# Patient Record
Sex: MALE
[De-identification: names, ages, dates, MRNs, and addresses within clinical notes are randomized per-mention and may not be internally consistent; named-entity substitution may affect disease eponyms.]

---

## 2020-03-01 ENCOUNTER — HOSPITAL ENCOUNTER (EMERGENCY)
Dept: HOSPITAL 5 - ED | Age: 50
LOS: 1 days | Discharge: HOME | End: 2020-03-02
Payer: MEDICARE

## 2020-03-01 DIAGNOSIS — F43.20: ICD-10-CM

## 2020-03-01 DIAGNOSIS — G40.909: ICD-10-CM

## 2020-03-01 DIAGNOSIS — N30.00: Primary | ICD-10-CM

## 2020-03-01 DIAGNOSIS — Z79.899: ICD-10-CM

## 2020-03-01 DIAGNOSIS — F20.9: ICD-10-CM

## 2020-03-01 LAB
ALBUMIN SERPL-MCNC: 3.8 G/DL (ref 3.9–5)
ALT SERPL-CCNC: 22 UNITS/L (ref 7–56)
BACTERIA #/AREA URNS HPF: (no result) /HPF
BASOPHILS # (AUTO): 0.1 K/MM3 (ref 0–0.1)
BASOPHILS NFR BLD AUTO: 0.3 % (ref 0–1.8)
BENZODIAZEPINES SCREEN,URINE: (no result)
BILIRUB UR QL STRIP: (no result)
BLOOD UR QL VISUAL: (no result)
BUN SERPL-MCNC: 10 MG/DL (ref 9–20)
BUN/CREAT SERPL: 9 %
CALCIUM SERPL-MCNC: 9.3 MG/DL (ref 8.4–10.2)
EOSINOPHIL # BLD AUTO: 0.1 K/MM3 (ref 0–0.4)
EOSINOPHIL NFR BLD AUTO: 0.3 % (ref 0–4.3)
HCT VFR BLD CALC: 46.9 % (ref 35.5–45.6)
HEMOLYSIS INDEX: 15
HGB BLD-MCNC: 15.6 GM/DL (ref 11.8–15.2)
LYMPHOCYTES # BLD AUTO: 1.8 K/MM3 (ref 1.2–5.4)
LYMPHOCYTES NFR BLD AUTO: 9.4 % (ref 13.4–35)
MCHC RBC AUTO-ENTMCNC: 33 % (ref 32–34)
MCV RBC AUTO: 89 FL (ref 84–94)
METHADONE SCREEN,URINE: (no result)
MONOCYTES # (AUTO): 1.6 K/MM3 (ref 0–0.8)
MONOCYTES % (AUTO): 8.2 % (ref 0–7.3)
MUCOUS THREADS #/AREA URNS HPF: (no result) /HPF
OPIATE SCREEN,URINE: (no result)
PH UR STRIP: 5 [PH] (ref 5–7)
PLATELET # BLD: 498 K/MM3 (ref 140–440)
RBC # BLD AUTO: 5.25 M/MM3 (ref 3.65–5.03)
RBC #/AREA URNS HPF: 4 /HPF (ref 0–6)
UROBILINOGEN UR-MCNC: 4 MG/DL (ref ?–2)
WBC #/AREA URNS HPF: 61 /HPF (ref 0–6)

## 2020-03-01 PROCEDURE — 87086 URINE CULTURE/COLONY COUNT: CPT

## 2020-03-01 PROCEDURE — 85025 COMPLETE CBC W/AUTO DIFF WBC: CPT

## 2020-03-01 PROCEDURE — 80053 COMPREHEN METABOLIC PANEL: CPT

## 2020-03-01 PROCEDURE — 80307 DRUG TEST PRSMV CHEM ANLYZR: CPT

## 2020-03-01 PROCEDURE — 82550 ASSAY OF CK (CPK): CPT

## 2020-03-01 PROCEDURE — 99284 EMERGENCY DEPT VISIT MOD MDM: CPT

## 2020-03-01 PROCEDURE — 36415 COLL VENOUS BLD VENIPUNCTURE: CPT

## 2020-03-01 PROCEDURE — 81001 URINALYSIS AUTO W/SCOPE: CPT

## 2020-03-01 PROCEDURE — G0480 DRUG TEST DEF 1-7 CLASSES: HCPCS

## 2020-03-01 PROCEDURE — 87186 SC STD MICRODIL/AGAR DIL: CPT

## 2020-03-01 PROCEDURE — 83735 ASSAY OF MAGNESIUM: CPT

## 2020-03-01 PROCEDURE — 87076 CULTURE ANAEROBE IDENT EACH: CPT

## 2020-03-01 PROCEDURE — 80320 DRUG SCREEN QUANTALCOHOLS: CPT

## 2020-03-01 NOTE — EVENT NOTE
ED Screening Note


Date of service: 03/01/20


Time: 19:16


ED Screening Note: 


Pt having difficulty with ambulation intermittently x 3 weeks since since his 

mother passed


Hx of epilepsy and schizophrenia


denies back injuries or falls





This initial assessment/diagnostic orders/clinical plan/treatment(s) is/are 

subject to change based on patients health status, clinical progression and re-

assessment by fellow clinical providers in the ED. Further treatment and workup 

at subsequent clinical providers discretion. Patient/guardian urged not to elope

from the ED as their condition may be serious if not clinically assessed and 

managed. 





Initial orders include: 


labs


mental health eval

## 2020-03-02 VITALS — SYSTOLIC BLOOD PRESSURE: 103 MMHG | DIASTOLIC BLOOD PRESSURE: 68 MMHG

## 2020-03-02 NOTE — EMERGENCY DEPARTMENT REPORT
ED General Adult HPI





- General


Chief complaint: Medical Clearance


Stated complaint: MEDICAL EVALUATION


Time Seen by Provider: 03/01/20 19:15


Source: patient


Mode of arrival: Ambulatory


Limitations: No Limitations





- History of Present Illness


Initial comments: 





Patient is a 49-year-old  male with past medical history of 

schizophrenia and traumatic brain injury who is presenting for need for medical 

evaluation.  Patient sister is brought him in stating that since November 2019 

patient has been having bouts of days where he is will stay in bed all day.  His

activity level is decreased.  Patient has days where he refused to go to the 

bathroom and screams that he is scared to get out of the bed.  On those days he 

eats and drinks less.  Patient also has had days where he is back to having his 

normal daily activities.  Patient sister states that he is and what he is 

episodes where he is just seems to not be himself.  She wants to try to have him

evaluated.  Patient has made no threats of homicidality or suicidality.  Does 

not appear to be hallucinating at this time.  Sister denies any fevers chills 

nausea vomiting diarrhea cough cold or congestion.





- Related Data


                                  Previous Rx's











 Medication  Instructions  Recorded  Last Taken  Type


 


levoFLOXacin [Levaquin TAB] 500 mg PO QDAY #7 tablet 03/02/20 Unknown Rx











                                    Allergies











Allergy/AdvReac Type Severity Reaction Status Date / Time


 


No Known Allergies Allergy   Verified 03/01/20 18:05














ED Review of Systems


ROS: 


Stated complaint: MEDICAL EVALUATION


Other details as noted in HPI





Comment: All other systems reviewed and negative





ED Past Medical Hx





- Past Medical History


Previous Medical History?: Yes


Hx Seizures: Yes


Hx Psychiatric Treatment: Yes (schizophrenia)


Additional medical history: epilepsy





- Surgical History


Past Surgical History?: No





- Social History


Smoking Status: Never Smoker


Substance Use Type: None





- Medications


Home Medications: 


                                Home Medications











 Medication  Instructions  Recorded  Confirmed  Last Taken  Type


 


levoFLOXacin [Levaquin TAB] 500 mg PO QDAY #7 tablet 03/02/20  Unknown Rx














ED Physical Exam





- General


Limitations: No Limitations


General appearance: alert, in no apparent distress





- Head


Head exam: Present: atraumatic, normocephalic





- Eye


Eye exam: Present: normal appearance, PERRL, EOMI





- ENT


ENT exam: Present: mucous membranes moist





- Neck


Neck exam: Present: normal inspection





- Respiratory


Respiratory exam: Present: normal lung sounds bilaterally.  Absent: respiratory 

distress, wheezes, rales, rhonchi





- Cardiovascular


Cardiovascular Exam: Present: regular rate, normal rhythm, normal heart sounds. 

Absent: systolic murmur, diastolic murmur, rubs, gallop





- GI/Abdominal


GI/Abdominal exam: Present: soft, normal bowel sounds.  Absent: distended, 

tenderness, guarding, rebound, rigid





- Rectal


Rectal exam: Present: deferred





- Extremities Exam


Extremities exam: Present: normal inspection





- Back Exam


Back exam: Present: normal inspection





- Neurological Exam


Neurological exam: Present: alert, oriented X3





- Psychiatric


Psychiatric exam: Present: normal affect, normal mood





- Skin


Skin exam: Present: warm, dry, intact, normal color.  Absent: rash





ED Course


                                   Vital Signs











  03/01/20 03/01/20 03/01/20





  19:16 20:56 23:10


 


Temperature 97.4 F L 98.5 F 98.5 F


 


Pulse Rate 116 H 107 H 98 H


 


Respiratory 18 18 16





Rate   


 


Blood Pressure 101/65  


 


Blood Pressure  112/70 98/55





[Left]   


 


O2 Sat by Pulse 99 98 97





Oximetry   














  03/02/20





  00:53


 


Temperature 98.4 F


 


Pulse Rate 99 H


 


Respiratory 14





Rate 


 


Blood Pressure 


 


Blood Pressure 100/56





[Left] 


 


O2 Sat by Pulse 100





Oximetry 














ED Medical Decision Making





- Lab Data


Result diagrams: 


                                 03/01/20 19:24





                                 03/01/20 19:24








                                   Lab Results











  03/01/20 03/01/20 03/01/20 Range/Units





  19:24 19:24 19:24 


 


WBC  19.4 H    (4.5-11.0)  K/mm3


 


RBC  5.25 H    (3.65-5.03)  M/mm3


 


Hgb  15.6 H    (11.8-15.2)  gm/dl


 


Hct  46.9 H    (35.5-45.6)  %


 


MCV  89    (84-94)  fl


 


MCH  30    (28-32)  pg


 


MCHC  33    (32-34)  %


 


RDW  13.1 L    (13.2-15.2)  %


 


Plt Count  498 H    (140-440)  K/mm3


 


Lymph % (Auto)  9.4 L    (13.4-35.0)  %


 


Mono % (Auto)  8.2 H    (0.0-7.3)  %


 


Eos % (Auto)  0.3    (0.0-4.3)  %


 


Baso % (Auto)  0.3    (0.0-1.8)  %


 


Lymph #  1.8    (1.2-5.4)  K/mm3


 


Mono #  1.6 H    (0.0-0.8)  K/mm3


 


Eos #  0.1    (0.0-0.4)  K/mm3


 


Baso #  0.1    (0.0-0.1)  K/mm3


 


Seg Neutrophils %  81.8 H    (40.0-70.0)  %


 


Seg Neutrophils #  15.9 H    (1.8-7.7)  K/mm3


 


Sodium   136 L   (137-145)  mmol/L


 


Potassium   3.4 L   (3.6-5.0)  mmol/L


 


Chloride   95.2 L   ()  mmol/L


 


Carbon Dioxide   22   (22-30)  mmol/L


 


Anion Gap   22   mmol/L


 


BUN   10   (9-20)  mg/dL


 


Creatinine   1.1   (0.8-1.5)  mg/dL


 


Estimated GFR   > 60   ml/min


 


BUN/Creatinine Ratio   9   %


 


Glucose   162 H   ()  mg/dL


 


Calcium   9.3   (8.4-10.2)  mg/dL


 


Magnesium    2.00  (1.7-2.3)  mg/dL


 


Total Bilirubin   0.60   (0.1-1.2)  mg/dL


 


AST   27   (5-40)  units/L


 


ALT   22   (7-56)  units/L


 


Alkaline Phosphatase   77   ()  units/L


 


Total Creatine Kinase   101   ()  units/L


 


Total Protein   8.1   (6.3-8.2)  g/dL


 


Albumin   3.8 L   (3.9-5)  g/dL


 


Albumin/Globulin Ratio   0.9   %


 


Urine Color     (Yellow)  


 


Urine Turbidity     (Clear)  


 


Urine pH     (5.0-7.0)  


 


Ur Specific Gravity     (1.003-1.030)  


 


Urine Protein     (Negative)  mg/dL


 


Urine Glucose (UA)     (Negative)  mg/dL


 


Urine Ketones     (Negative)  mg/dL


 


Urine Blood     (Negative)  


 


Urine Nitrite     (Negative)  


 


Urine Bilirubin     (Negative)  


 


Urine Urobilinogen     (<2.0)  mg/dL


 


Ur Leukocyte Esterase     (Negative)  


 


Urine WBC (Auto)     (0.0-6.0)  /HPF


 


Urine RBC (Auto)     (0.0-6.0)  /HPF


 


Urine Bacteria (Auto)     (Negative)  /HPF


 


Urine WBC Clumps     /HPF


 


Urine Mucus     /HPF


 


Salicylates     (2.8-20.0)  mg/dL


 


Urine Opiates Screen     


 


Urine Methadone Screen     


 


Acetaminophen     (10.0-30.0)  ug/mL


 


Ur Barbiturates Screen     


 


Ur Phencyclidine Scrn     


 


Ur Amphetamines Screen     


 


U Benzodiazepines Scrn     


 


Urine Cocaine Screen     


 


U Marijuana (THC) Screen     


 


Drugs of Abuse Note     


 


Plasma/Serum Alcohol     (0-0.07)  %














  03/01/20 03/01/20 03/01/20 Range/Units





  19:24 19:24 19:24 


 


WBC     (4.5-11.0)  K/mm3


 


RBC     (3.65-5.03)  M/mm3


 


Hgb     (11.8-15.2)  gm/dl


 


Hct     (35.5-45.6)  %


 


MCV     (84-94)  fl


 


MCH     (28-32)  pg


 


MCHC     (32-34)  %


 


RDW     (13.2-15.2)  %


 


Plt Count     (140-440)  K/mm3


 


Lymph % (Auto)     (13.4-35.0)  %


 


Mono % (Auto)     (0.0-7.3)  %


 


Eos % (Auto)     (0.0-4.3)  %


 


Baso % (Auto)     (0.0-1.8)  %


 


Lymph #     (1.2-5.4)  K/mm3


 


Mono #     (0.0-0.8)  K/mm3


 


Eos #     (0.0-0.4)  K/mm3


 


Baso #     (0.0-0.1)  K/mm3


 


Seg Neutrophils %     (40.0-70.0)  %


 


Seg Neutrophils #     (1.8-7.7)  K/mm3


 


Sodium     (137-145)  mmol/L


 


Potassium     (3.6-5.0)  mmol/L


 


Chloride     ()  mmol/L


 


Carbon Dioxide     (22-30)  mmol/L


 


Anion Gap     mmol/L


 


BUN     (9-20)  mg/dL


 


Creatinine     (0.8-1.5)  mg/dL


 


Estimated GFR     ml/min


 


BUN/Creatinine Ratio     %


 


Glucose     ()  mg/dL


 


Calcium     (8.4-10.2)  mg/dL


 


Magnesium     (1.7-2.3)  mg/dL


 


Total Bilirubin     (0.1-1.2)  mg/dL


 


AST     (5-40)  units/L


 


ALT     (7-56)  units/L


 


Alkaline Phosphatase     ()  units/L


 


Total Creatine Kinase     ()  units/L


 


Total Protein     (6.3-8.2)  g/dL


 


Albumin     (3.9-5)  g/dL


 


Albumin/Globulin Ratio     %


 


Urine Color     (Yellow)  


 


Urine Turbidity     (Clear)  


 


Urine pH     (5.0-7.0)  


 


Ur Specific Gravity     (1.003-1.030)  


 


Urine Protein     (Negative)  mg/dL


 


Urine Glucose (UA)     (Negative)  mg/dL


 


Urine Ketones     (Negative)  mg/dL


 


Urine Blood     (Negative)  


 


Urine Nitrite     (Negative)  


 


Urine Bilirubin     (Negative)  


 


Urine Urobilinogen     (<2.0)  mg/dL


 


Ur Leukocyte Esterase     (Negative)  


 


Urine WBC (Auto)     (0.0-6.0)  /HPF


 


Urine RBC (Auto)     (0.0-6.0)  /HPF


 


Urine Bacteria (Auto)     (Negative)  /HPF


 


Urine WBC Clumps     /HPF


 


Urine Mucus     /HPF


 


Salicylates  < 0.3 L    (2.8-20.0)  mg/dL


 


Urine Opiates Screen     


 


Urine Methadone Screen     


 


Acetaminophen   < 5.0 L   (10.0-30.0)  ug/mL


 


Ur Barbiturates Screen     


 


Ur Phencyclidine Scrn     


 


Ur Amphetamines Screen     


 


U Benzodiazepines Scrn     


 


Urine Cocaine Screen     


 


U Marijuana (THC) Screen     


 


Drugs of Abuse Note     


 


Plasma/Serum Alcohol    < 0.01  (0-0.07)  %














  03/01/20 03/01/20 Range/Units





  21:57 21:57 


 


WBC    (4.5-11.0)  K/mm3


 


RBC    (3.65-5.03)  M/mm3


 


Hgb    (11.8-15.2)  gm/dl


 


Hct    (35.5-45.6)  %


 


MCV    (84-94)  fl


 


MCH    (28-32)  pg


 


MCHC    (32-34)  %


 


RDW    (13.2-15.2)  %


 


Plt Count    (140-440)  K/mm3


 


Lymph % (Auto)    (13.4-35.0)  %


 


Mono % (Auto)    (0.0-7.3)  %


 


Eos % (Auto)    (0.0-4.3)  %


 


Baso % (Auto)    (0.0-1.8)  %


 


Lymph #    (1.2-5.4)  K/mm3


 


Mono #    (0.0-0.8)  K/mm3


 


Eos #    (0.0-0.4)  K/mm3


 


Baso #    (0.0-0.1)  K/mm3


 


Seg Neutrophils %    (40.0-70.0)  %


 


Seg Neutrophils #    (1.8-7.7)  K/mm3


 


Sodium    (137-145)  mmol/L


 


Potassium    (3.6-5.0)  mmol/L


 


Chloride    ()  mmol/L


 


Carbon Dioxide    (22-30)  mmol/L


 


Anion Gap    mmol/L


 


BUN    (9-20)  mg/dL


 


Creatinine    (0.8-1.5)  mg/dL


 


Estimated GFR    ml/min


 


BUN/Creatinine Ratio    %


 


Glucose    ()  mg/dL


 


Calcium    (8.4-10.2)  mg/dL


 


Magnesium    (1.7-2.3)  mg/dL


 


Total Bilirubin    (0.1-1.2)  mg/dL


 


AST    (5-40)  units/L


 


ALT    (7-56)  units/L


 


Alkaline Phosphatase    ()  units/L


 


Total Creatine Kinase    ()  units/L


 


Total Protein    (6.3-8.2)  g/dL


 


Albumin    (3.9-5)  g/dL


 


Albumin/Globulin Ratio    %


 


Urine Color   Leslie  (Yellow)  


 


Urine Turbidity   Slightly-cloudy  (Clear)  


 


Urine pH   5.0  (5.0-7.0)  


 


Ur Specific Gravity   1.017  (1.003-1.030)  


 


Urine Protein   30 mg/dl  (Negative)  mg/dL


 


Urine Glucose (UA)   Neg  (Negative)  mg/dL


 


Urine Ketones   Neg  (Negative)  mg/dL


 


Urine Blood   Sm  (Negative)  


 


Urine Nitrite   Neg  (Negative)  


 


Urine Bilirubin   Neg  (Negative)  


 


Urine Urobilinogen   4.0  (<2.0)  mg/dL


 


Ur Leukocyte Esterase   Mod  (Negative)  


 


Urine WBC (Auto)   61.0 H  (0.0-6.0)  /HPF


 


Urine RBC (Auto)   4.0  (0.0-6.0)  /HPF


 


Urine Bacteria (Auto)   4+  (Negative)  /HPF


 


Urine WBC Clumps   2+  /HPF


 


Urine Mucus   1+  /HPF


 


Salicylates    (2.8-20.0)  mg/dL


 


Urine Opiates Screen  Presumptive negative   


 


Urine Methadone Screen  Presumptive negative   


 


Acetaminophen    (10.0-30.0)  ug/mL


 


Ur Barbiturates Screen  Presumptive negative   


 


Ur Phencyclidine Scrn  Presumptive negative   


 


Ur Amphetamines Screen  Presumptive negative   


 


U Benzodiazepines Scrn  Presumptive negative   


 


Urine Cocaine Screen  Presumptive negative   


 


U Marijuana (THC) Screen  Presumptive negative   


 


Drugs of Abuse Note  Disclamer   


 


Plasma/Serum Alcohol    (0-0.07)  %














- Medical Decision Making





Patient does show evidence of a urinary tract infection.  Has elevated white 

count.  Patient is afebrile.  His tachycardia and mild hypotension is likely 

secondary to some dehydration as his sodium is low.  Patient is nontoxic-

appearing.  Patient was given 2 boluses of normal saline.  Was started on 

Levaquin for his UTI.  Patient will be discharged home with follow-up with a 

primary care physician.  Patient also given some outpatient resources for mental

 health evaluation.  Patient has a history of traumatic brain disorder and is 

not a candidate for 1013.  Patient is not meeting criteria for acute 

stabilization at this time.


Critical care attestation.: 


If time is entered above; I have spent that time in minutes in the direct care 

of this critically ill patient, excluding procedure time.








ED Disposition


Clinical Impression: 


 Acute cystitis, Prolonged grief reaction





Disposition: DC-01 TO HOME OR SELFCARE


Is pt being admited?: No


Does the pt Need Aspirin: No


Condition: Stable


Instructions:  Urinary Tract Infection in Men (ED), Grief and Loss (ED)


Referrals: 


CELESTE ROMERO MD [Staff Physician] - 3-5 Days


Time of Disposition: 02:09

## 2020-03-09 ENCOUNTER — HOSPITAL ENCOUNTER (EMERGENCY)
Dept: HOSPITAL 5 - ED | Age: 50
LOS: 1 days | Discharge: HOME | End: 2020-03-10
Payer: MEDICARE
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
## 2020-03-09 DIAGNOSIS — F20.9: ICD-10-CM
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
## 2020-03-09 DIAGNOSIS — S02.2XXA: Primary | ICD-10-CM

## 2020-03-09 DIAGNOSIS — Z79.899: ICD-10-CM

## 2020-03-09 DIAGNOSIS — Z86.69: ICD-10-CM

## 2020-03-09 DIAGNOSIS — G40.909: ICD-10-CM

## 2020-03-09 PROCEDURE — 70486 CT MAXILLOFACIAL W/O DYE: CPT

## 2020-03-09 PROCEDURE — 80048 BASIC METABOLIC PNL TOTAL CA: CPT

## 2020-03-09 PROCEDURE — 85027 COMPLETE CBC AUTOMATED: CPT

## 2020-03-09 PROCEDURE — 70450 CT HEAD/BRAIN W/O DYE: CPT

## 2020-03-09 PROCEDURE — 36415 COLL VENOUS BLD VENIPUNCTURE: CPT

## 2020-03-09 PROCEDURE — 99284 EMERGENCY DEPT VISIT MOD MDM: CPT

## 2020-03-10 VITALS — SYSTOLIC BLOOD PRESSURE: 109 MMHG | DIASTOLIC BLOOD PRESSURE: 69 MMHG

## 2020-03-10 LAB
BUN SERPL-MCNC: 12 MG/DL (ref 9–20)
BUN/CREAT SERPL: 13 %
CALCIUM SERPL-MCNC: 9.2 MG/DL (ref 8.4–10.2)
HCT VFR BLD CALC: 43.5 % (ref 35.5–45.6)
HEMOLYSIS INDEX: 4
HGB BLD-MCNC: 14.7 GM/DL (ref 11.8–15.2)
MCHC RBC AUTO-ENTMCNC: 34 % (ref 32–34)
MCV RBC AUTO: 90 FL (ref 84–94)
PLATELET # BLD: 412 K/MM3 (ref 140–440)
RBC # BLD AUTO: 4.84 M/MM3 (ref 3.65–5.03)

## 2020-03-10 NOTE — CAT SCAN REPORT
CT head/brain wo con 



INDICATION / CLINICAL INFORMATION:

fall with swelling and bleeding to nose.



TECHNIQUE:

Axial CT imaging of the brain was obtained without contrast. Coronal and sagittal reformatted imaging
 obtained and reviewed.  All CT scans at this location are performed using CT dose reduction for ALAR
A by means of automated exposure control. 



COMPARISON:

None available.



FINDINGS:

No intracranial hemorrhage, mass, or midline shift identified. No extra-axial fluid collection or sug
gestion of acute territorial infarction. Ventricular system and basilar cisterns are unremarkable.



Visualized paranasal sinuses and mastoid air cells are well aerated and clear. No fracture of the christiano
varium. However there does appear to be a nondisplaced nasal bone tip fracture.



IMPRESSION:

1. No acute intracranial abnormality.

2. Nasal bone tip fracture



Signer Name: Maureen Mahmood MD 

Signed: 3/10/2020 12:51 AM

Workstation Name: [id number]

## 2020-03-10 NOTE — CAT SCAN REPORT
CT facial bones wo con 



INDICATION / CLINICAL INFORMATION:

fall with swelling and bleeding to nose.



TECHNIQUE:

Axial CT imaging of the facial bones was obtained without contrast. Coronal and sagittal reformatted 
imaging obtained and reviewed.  All CT scans at this location are performed using CT dose reduction f
or ALARA by means of automated exposure control. 



COMPARISON:

None available.



FINDINGS:

There is nondisplaced fracture of the nasal bone tip. In addition there is comminuted displaced fract
ure of the nasal septum. Septum is deviated to the right. There is soft tissue swelling surrounding t
he nasal bone.



There is mild mucosal thickening throughout the left maxillary antrum but no air-fluid levels. No add
itional fractures are identified. Both orbits are intact.



IMPRESSION:

1. Nondisplaced nasal bone tip fracture.

2. Comminuted nasal septal fracture with deviation to the right.

3. Maxillary mucosal thickening.



Signer Name: Maureen Mahmood MD 

Signed: 3/10/2020 12:57 AM

Workstation Name: [id number]

## 2020-03-10 NOTE — EMERGENCY DEPARTMENT REPORT
ED Fall HPI





- General


Chief Complaint: Fall


Stated Complaint: NOSE INJURY


Time Seen by Provider: 03/10/20 01:11


Source: patient, family


Mode of arrival: Wheelchair





- History of Present Illness


Initial Comments: 





Mr. Scherer is a 49-year-old male with history of traumatic brain injury and 

epilepsy who presents after fall.  He fell and hit the floor getting out of bed.

 Has swollen nose nasal abrasion and had bleeding from the nose.  Since November

has had erratic behavior.  At times sleeping in bed.  At times hyper active.  

Recently treated for urinary tract infection.  He is currently not under the 

care of a physician.  Not taking any medications.  He has been cleared for ep

ilepsy.  Previous neurologist did not recommend any further antiepileptics.


MD Complaint: fall


-: This evening


Fall From: out of bed


When Fall Occurred: 1 hour PTA


Fall Witnessed: yes, by family


Place Fall Occurred: home


Loss of Consciousness: none


Prolonged Down Time?: no


Symptoms Prior to Fall: none


Location: head


Severity: mild


Context: tripped/slipped, recent illness





- Related Data


                                  Previous Rx's











 Medication  Instructions  Recorded  Last Taken  Type


 


levoFLOXacin [Levaquin TAB] 500 mg PO QDAY #7 tablet 03/02/20 Unknown Rx











                                    Allergies











Allergy/AdvReac Type Severity Reaction Status Date / Time


 


No Known Allergies Allergy   Verified 03/01/20 18:05














ED Review of Systems


ROS: 


Stated complaint: NOSE INJURY


Other details as noted in HPI





Comment: Unobtainable due to pts medical conditions (Patient gives limited 

history due to history of brain injury)





ED Past Medical Hx





- Past Medical History


Previous Medical History?: Yes


Hx Seizures: Yes


Hx Psychiatric Treatment: Yes (schizophrenia)


Additional medical history: epilepsy, traumatic brain injury





- Surgical History


Past Surgical History?: No





- Social History


Smoking Status: Never Smoker


Substance Use Type: None





- Medications


Home Medications: 


                                Home Medications











 Medication  Instructions  Recorded  Confirmed  Last Taken  Type


 


levoFLOXacin [Levaquin TAB] 500 mg PO QDAY #7 tablet 03/02/20  Unknown Rx














ED Physical Exam





- General


Limitations: Other


General appearance: alert, in no apparent distress





- Head


Head exam: Present: normocephalic, other (Mildly edematous nasal region with 1 

cm superficial abrasion no septal hematoma no epistaxis)





- Eye


Eye exam: Present: normal appearance





- ENT


ENT exam: Present: mucous membranes moist





- Neck


Neck exam: Present: normal inspection





- Respiratory


Respiratory exam: Present: normal lung sounds bilaterally.  Absent: respiratory 

distress, wheezes, rales, rhonchi





- Cardiovascular


Cardiovascular Exam: Present: regular rate, normal rhythm, normal heart sounds. 

Absent: systolic murmur, diastolic murmur, rubs, gallop





- GI/Abdominal


GI/Abdominal exam: Present: soft, normal bowel sounds.  Absent: distended, 

tenderness, guarding, rebound





- Rectal


Rectal exam: Present: deferred





- Extremities Exam


Extremities exam: Present: normal inspection





- Neurological Exam


Neurological exam: Present: alert





- Psychiatric


Psychiatric exam: Present: normal mood, flat affect





- Skin


Skin exam: Present: warm, dry, intact, normal color.  Absent: rash





ED Course





                                   Vital Signs











  03/10/20 03/10/20





  01:24 01:25


 


Pulse Rate 101 H 


 


Respiratory 18 18





Rate  


 


Blood Pressure 92/65 





[Right]  


 


O2 Sat by Pulse 96 96





Oximetry  














ED Medical Decision Making





- Lab Data


Result diagrams: 


                                 03/10/20 00:37





                                 03/10/20 00:37








                         Laboratory Results - last 24 hr











  03/10/20 03/10/20





  00:37 00:37


 


WBC  12.3 H 


 


RBC  4.84 


 


Hgb  14.7 


 


Hct  43.5 


 


MCV  90 


 


MCH  31 


 


MCHC  34 


 


RDW  13.4 


 


Plt Count  412 


 


Sodium   141


 


Potassium   4.0


 


Chloride   96.3 L


 


Carbon Dioxide   30


 


Anion Gap   19


 


BUN   12


 


Creatinine   0.9


 


Estimated GFR   > 60


 


BUN/Creatinine Ratio   13


 


Glucose   110 H


 


Calcium   9.2














- Radiology Data





CT head: No acute intracranial process, CT face: Nasal fracture with deviated 

septum





- Medical Decision Making





Nasal bone fracture, closed head injury, fall.  Sister who is caregiver will 

obtain follow-up for primary care concerns.  Referred to ENT for nasal fracture


Critical care attestation.: 


If time is entered above; I have spent that time in minutes in the direct care 

of this critically ill patient, excluding procedure time.








ED Disposition


Clinical Impression: 


 Fall, Nasal bone fracture





Disposition: DC-01 TO HOME OR SELFCARE


Is pt being admited?: No


Does the pt Need Aspirin: No


Condition: Stable


Instructions:  Nasal Fracture (ED)


Referrals: 


SONG XIAO MD [Staff Physician] - 3-5 Days